# Patient Record
Sex: FEMALE | URBAN - METROPOLITAN AREA
[De-identification: names, ages, dates, MRNs, and addresses within clinical notes are randomized per-mention and may not be internally consistent; named-entity substitution may affect disease eponyms.]

---

## 2020-11-04 ENCOUNTER — HOSPITAL ENCOUNTER (OUTPATIENT)
Age: 30
Discharge: HOME | End: 2020-11-04
Payer: COMMERCIAL

## 2020-11-04 PROCEDURE — U0003 INFECTIOUS AGENT DETECTION BY NUCLEIC ACID (DNA OR RNA); SEVERE ACUTE RESPIRATORY SYNDROME CORONAVIRUS 2 (SARS-COV-2) (CORONAVIRUS DISEASE [COVID-19]), AMPLIFIED PROBE TECHNIQUE, MAKING USE OF HIGH THROUGHPUT TECHNOLOGIES AS DESCRIBED BY CMS-2020-01-R: HCPCS

## 2020-11-05 LAB
SARS-COV-2, RAPID: NORMAL
SARS-COV-2: NORMAL
SARS-COV-2: NOT DETECTED
SOURCE: NORMAL

## 2023-09-28 ENCOUNTER — TELEPHONE (OUTPATIENT)
Age: 33
End: 2023-09-28

## 2023-09-28 NOTE — TELEPHONE ENCOUNTER
Rodolfo Ascencio is calling to request a refill on the following medication(s):    Medication Request:  Requested Prescriptions     Pending Prescriptions Disp Refills    Mometasone Furoate 50 MCG/ACT AERO       Sig: Inhale 2 sprays into the lungs daily 2 sprays each nostril daily       Last Visit Date (If Applicable):  Visit date not found    Next Visit Date:    10/3/2023

## 2023-09-28 NOTE — TELEPHONE ENCOUNTER
Patient is requesting refill on Mometasone Nasal spray. [address]. Patient has an appt with you on 10/3 for sinus issues.

## 2023-09-30 VITALS
SYSTOLIC BLOOD PRESSURE: 108 MMHG | HEART RATE: 85 BPM | DIASTOLIC BLOOD PRESSURE: 68 MMHG | RESPIRATION RATE: 14 BRPM | WEIGHT: 117.4 LBS

## 2023-09-30 RX ORDER — BECLOMETHASONE DIPROPIONATE 80 UG/1
2 AEROSOL, METERED NASAL 2 TIMES DAILY
COMMUNITY

## 2023-09-30 RX ORDER — AZELASTINE HYDROCHLORIDE, FLUTICASONE PROPIONATE 137; 50 UG/1; UG/1
SPRAY, METERED NASAL
COMMUNITY

## 2023-09-30 RX ORDER — CYCLOBENZAPRINE HCL 5 MG
5 TABLET ORAL 3 TIMES DAILY PRN
COMMUNITY

## 2023-09-30 RX ORDER — CELECOXIB 200 MG/1
200 CAPSULE ORAL 2 TIMES DAILY
COMMUNITY

## 2023-09-30 RX ORDER — ATOGEPANT 60 MG/1
1 TABLET ORAL DAILY
COMMUNITY
Start: 2023-09-01

## 2023-10-03 ENCOUNTER — OFFICE VISIT (OUTPATIENT)
Age: 33
End: 2023-10-03

## 2023-10-03 VITALS
TEMPERATURE: 98.1 F | DIASTOLIC BLOOD PRESSURE: 82 MMHG | BODY MASS INDEX: 19.97 KG/M2 | HEART RATE: 94 BPM | SYSTOLIC BLOOD PRESSURE: 118 MMHG | OXYGEN SATURATION: 99 % | WEIGHT: 117 LBS | HEIGHT: 64 IN | RESPIRATION RATE: 14 BRPM

## 2023-10-03 DIAGNOSIS — G43.919 INTRACTABLE MIGRAINE WITHOUT STATUS MIGRAINOSUS, UNSPECIFIED MIGRAINE TYPE: ICD-10-CM

## 2023-10-03 DIAGNOSIS — J01.01 ACUTE RECURRENT MAXILLARY SINUSITIS: Primary | ICD-10-CM

## 2023-10-03 RX ORDER — AMOXICILLIN AND CLAVULANATE POTASSIUM 875; 125 MG/1; MG/1
1 TABLET, FILM COATED ORAL 2 TIMES DAILY
Qty: 28 TABLET | Refills: 0 | Status: SHIPPED | OUTPATIENT
Start: 2023-10-03 | End: 2023-10-17

## 2023-10-03 RX ORDER — ONDANSETRON 4 MG/1
4 TABLET, ORALLY DISINTEGRATING ORAL 3 TIMES DAILY PRN
Qty: 21 TABLET | Refills: 1 | Status: SHIPPED | OUTPATIENT
Start: 2023-10-03

## 2023-10-03 RX ORDER — MOMETASONE FUROATE 50 UG/1
SPRAY, METERED NASAL
COMMUNITY
Start: 2023-09-28

## 2023-10-03 SDOH — ECONOMIC STABILITY: HOUSING INSECURITY
IN THE LAST 12 MONTHS, WAS THERE A TIME WHEN YOU DID NOT HAVE A STEADY PLACE TO SLEEP OR SLEPT IN A SHELTER (INCLUDING NOW)?: NO

## 2023-10-03 SDOH — ECONOMIC STABILITY: INCOME INSECURITY: HOW HARD IS IT FOR YOU TO PAY FOR THE VERY BASICS LIKE FOOD, HOUSING, MEDICAL CARE, AND HEATING?: NOT HARD AT ALL

## 2023-10-03 SDOH — ECONOMIC STABILITY: FOOD INSECURITY: WITHIN THE PAST 12 MONTHS, THE FOOD YOU BOUGHT JUST DIDN'T LAST AND YOU DIDN'T HAVE MONEY TO GET MORE.: NEVER TRUE

## 2023-10-03 SDOH — ECONOMIC STABILITY: FOOD INSECURITY: WITHIN THE PAST 12 MONTHS, YOU WORRIED THAT YOUR FOOD WOULD RUN OUT BEFORE YOU GOT MONEY TO BUY MORE.: NEVER TRUE

## 2023-10-03 ASSESSMENT — PATIENT HEALTH QUESTIONNAIRE - PHQ9
SUM OF ALL RESPONSES TO PHQ QUESTIONS 1-9: 0
SUM OF ALL RESPONSES TO PHQ QUESTIONS 1-9: 0
2. FEELING DOWN, DEPRESSED OR HOPELESS: 0
SUM OF ALL RESPONSES TO PHQ QUESTIONS 1-9: 0
SUM OF ALL RESPONSES TO PHQ9 QUESTIONS 1 & 2: 0
SUM OF ALL RESPONSES TO PHQ QUESTIONS 1-9: 0
1. LITTLE INTEREST OR PLEASURE IN DOING THINGS: 0

## 2023-10-03 NOTE — PROGRESS NOTES
[facility]     Date of Visit:  10/6/2023  Patient Name: Sarai Fraire   Patient :  1990     CHIEF COMPLAINT/HPI:     Sarai Fraire is a 26 y.o. female who presents today for an general visit to be evaluated for the following condition(s):  Chief Complaint   Patient presents with    Sinus Problem     Green and yellow phleghm x 2 weeks. Cough     Patient problems with sinus congestion/drainage/chest.  Has chronic migraines and needs intermittent FMLA filled out. REVIEW OF SYSTEM      Review of Systems   Respiratory:  Negative for chest tightness and shortness of breath. Cardiovascular:  Negative for chest pain. REVIEWED INFORMATION      Allergies   Allergen Reactions    Gardasil 9 [Hpv 9-Valent Recomb Vaccine] Hives       Current Outpatient Medications   Medication Sig Dispense Refill    ondansetron (ZOFRAN-ODT) 4 MG disintegrating tablet Take 1 tablet by mouth 3 times daily as needed for Nausea or Vomiting 21 tablet 1    amoxicillin-clavulanate (AUGMENTIN) 875-125 MG per tablet Take 1 tablet by mouth 2 times daily for 14 days 28 tablet 0    QULIPTA 60 MG TABS Take 1 tablet by mouth daily      cyclobenzaprine (FLEXERIL) 5 MG tablet Take 1 tablet by mouth 3 times daily as needed for Muscle spasms      Mometasone Furoate 50 MCG/ACT AERO 2 sprays by Nasal route daily 2 sprays each nostril daily 1 each 5    mometasone (NASONEX) 50 MCG/ACT nasal spray       Azelastine-Fluticasone 137-50 MCG/ACT SUSP by Nasal route (Patient not taking: Reported on 10/3/2023)      beclomethasone (QNASL) 80 MCG/ACT AERS nasal spray 2 sprays by Each Nostril route 2 times daily (Patient not taking: Reported on 10/3/2023)      celecoxib (CELEBREX) 200 MG capsule Take 1 capsule by mouth 2 times daily (Patient not taking: Reported on 10/3/2023)       No current facility-administered medications for this visit. There is no problem list on file for this patient.       Past Medical History:   Diagnosis

## 2023-10-06 ASSESSMENT — ENCOUNTER SYMPTOMS
SHORTNESS OF BREATH: 0
CHEST TIGHTNESS: 0

## 2024-04-09 ENCOUNTER — TELEPHONE (OUTPATIENT)
Age: 34
End: 2024-04-09

## 2024-05-15 ENCOUNTER — OFFICE VISIT (OUTPATIENT)
Age: 34
End: 2024-05-15
Payer: COMMERCIAL

## 2024-05-15 VITALS
SYSTOLIC BLOOD PRESSURE: 120 MMHG | TEMPERATURE: 97.8 F | OXYGEN SATURATION: 98 % | HEART RATE: 80 BPM | WEIGHT: 116.9 LBS | BODY MASS INDEX: 20.07 KG/M2 | DIASTOLIC BLOOD PRESSURE: 70 MMHG | RESPIRATION RATE: 14 BRPM

## 2024-05-15 DIAGNOSIS — J01.01 ACUTE RECURRENT MAXILLARY SINUSITIS: Primary | ICD-10-CM

## 2024-05-15 DIAGNOSIS — M62.838 MUSCLE SPASM: ICD-10-CM

## 2024-05-15 PROCEDURE — 99213 OFFICE O/P EST LOW 20 MIN: CPT | Performed by: FAMILY MEDICINE

## 2024-05-15 RX ORDER — CEFDINIR 300 MG/1
300 CAPSULE ORAL 2 TIMES DAILY
Qty: 20 CAPSULE | Refills: 0 | Status: SHIPPED | OUTPATIENT
Start: 2024-05-15 | End: 2024-05-25

## 2024-05-15 RX ORDER — PREDNISONE 10 MG/1
TABLET ORAL
Qty: 10 TABLET | Refills: 0 | Status: SHIPPED | OUTPATIENT
Start: 2024-05-15

## 2024-05-15 RX ORDER — CYCLOBENZAPRINE HCL 5 MG
5 TABLET ORAL 3 TIMES DAILY PRN
Qty: 30 TABLET | Refills: 0 | Status: SHIPPED | OUTPATIENT
Start: 2024-05-15 | End: 2024-05-25

## 2024-05-15 ASSESSMENT — PATIENT HEALTH QUESTIONNAIRE - PHQ9
2. FEELING DOWN, DEPRESSED OR HOPELESS: NOT AT ALL
SUM OF ALL RESPONSES TO PHQ9 QUESTIONS 1 & 2: 0
SUM OF ALL RESPONSES TO PHQ QUESTIONS 1-9: 0
SUM OF ALL RESPONSES TO PHQ QUESTIONS 1-9: 0
1. LITTLE INTEREST OR PLEASURE IN DOING THINGS: NOT AT ALL
SUM OF ALL RESPONSES TO PHQ QUESTIONS 1-9: 0
SUM OF ALL RESPONSES TO PHQ QUESTIONS 1-9: 0

## 2024-05-15 NOTE — PROGRESS NOTES
Muscle spasm       Orders Placed This Encounter   Medications    cefdinir (OMNICEF) 300 MG capsule     Sig: Take 1 capsule by mouth 2 times daily for 10 days     Dispense:  20 capsule     Refill:  0    predniSONE (DELTASONE) 10 MG tablet     Sig: 3 tabs daily X 3 days then 2 tabs X 3 days then 1 tab X 3 days then off; take with food     Dispense:  10 tablet     Refill:  0    neomycin-polymyxin-hydrocortisone (CORTISPORIN) 3.5-18765-6 otic solution     Sig: Place 4 drops in ear(s) 3 times daily for 10 days Instill into BILAT Ear     Dispense:  10 mL     Refill:  0    cyclobenzaprine (FLEXERIL) 5 MG tablet     Sig: Take 1 tablet by mouth 3 times daily as needed for Muscle spasms     Dispense:  30 tablet     Refill:  0             No follow-ups on file.      COMMUNICATION:     Disposition and Communication:     Electronically signed by Lester Coronado MD on 5/15/2024 at 9:41 AM

## 2024-05-27 ASSESSMENT — ENCOUNTER SYMPTOMS
CHEST TIGHTNESS: 0
SHORTNESS OF BREATH: 0

## 2024-08-28 ENCOUNTER — OFFICE VISIT (OUTPATIENT)
Age: 34
End: 2024-08-28
Payer: COMMERCIAL

## 2024-08-28 VITALS
WEIGHT: 116.2 LBS | SYSTOLIC BLOOD PRESSURE: 120 MMHG | OXYGEN SATURATION: 99 % | HEART RATE: 98 BPM | BODY MASS INDEX: 19.84 KG/M2 | TEMPERATURE: 98 F | HEIGHT: 64 IN | DIASTOLIC BLOOD PRESSURE: 80 MMHG

## 2024-08-28 DIAGNOSIS — R53.83 OTHER FATIGUE: ICD-10-CM

## 2024-08-28 DIAGNOSIS — R61 NIGHT SWEATS: ICD-10-CM

## 2024-08-28 DIAGNOSIS — Z00.00 HEALTH MAINTENANCE EXAMINATION: Primary | ICD-10-CM

## 2024-08-28 PROCEDURE — 99395 PREV VISIT EST AGE 18-39: CPT | Performed by: FAMILY MEDICINE

## 2024-08-28 RX ORDER — GALCANEZUMAB 120 MG/ML
INJECTION, SOLUTION SUBCUTANEOUS
COMMUNITY
Start: 2024-07-05

## 2024-08-28 RX ORDER — UBROGEPANT 100 MG/1
TABLET ORAL
COMMUNITY
Start: 2024-07-02

## 2024-08-28 NOTE — PROGRESS NOTES
[facility]     Date of Visit:  9/3/2024  Patient Name: Kaitlyn Moses   Patient :  1990     CHIEF COMPLAINT/HPI:     Kaitlyn Moses is a 34 y.o. female who presents today for an general visit to be evaluated for the following condition(s):  Chief Complaint   Patient presents with    Annual Exam     She is here for her annual visit without labs.  She does have FMLA forms to be filled out.    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   is working many hours.  She is having lots of hormonal issues.  She noticing more migraines the week before her period and is getting drastic mood swings.  She is uncertain what is going on.  Estrogren thyroid or cortisol .  She is having random sweating episodes as well.  Joint pain.  Her periods have been more irregular.    REVIEW OF SYSTEM      Review of Systems   Respiratory:  Negative for chest tightness and shortness of breath.    Cardiovascular:  Negative for chest pain.         REVIEWED INFORMATION      Allergies   Allergen Reactions    Gardasil 9 [Hpv 9-Valent Recomb Vaccine] Hives       Current Outpatient Medications   Medication Sig Dispense Refill    EMGALITY 120 MG/ML SOAJ INJECT SUBCUTANEOUSLY AS DIRECTED 2 INJECTIONS THE FIRST MONTH      UBRELVY 100 MG TABS TAKE ONE TABLET BY MOUTH AS NEEDED FOR MIGRAINE EVERY 2 HOURS AS DIRECTED MAX 2 TABLETS DAILY AND 4 TABLETS WEEKLY      Multiple Vitamin (MULTI-VITAMIN DAILY PO) Take 1 tablet by mouth daily      ondansetron (ZOFRAN-ODT) 4 MG disintegrating tablet Take 1 tablet by mouth 3 times daily as needed for Nausea or Vomiting 21 tablet 1    cyclobenzaprine (FLEXERIL) 5 MG tablet Take 1 tablet by mouth 3 times daily as needed for Muscle spasms      Mometasone Furoate 50 MCG/ACT AERO 2 sprays by Nasal route daily 2 sprays each nostril daily 1 each 5     No current facility-administered medications for this visit.        There is no problem list on file for this patient.      Past Medical History:

## 2024-08-28 NOTE — PATIENT INSTRUCTIONS
Kaitlyn    Thank you for choosing [facility].  We know you have options when it comes to your healthcare; we appreciate that you chose us. Our goal is to provide exceptional  service and world class care to every patient.  You will be receiving a survey via email or text message asking for your feedback.  Please take a few minutes to share your thoughts about your recent visit. Your comments help us understand what we do well and ways we can improve.  Thank you in advance for your valuable feedback.      Dr. NARESH Holm

## 2024-09-03 ASSESSMENT — ENCOUNTER SYMPTOMS
CHEST TIGHTNESS: 0
SHORTNESS OF BREATH: 0

## 2024-09-06 ENCOUNTER — PATIENT MESSAGE (OUTPATIENT)
Age: 34
End: 2024-09-06

## 2024-09-06 ENCOUNTER — TELEPHONE (OUTPATIENT)
Age: 34
End: 2024-09-06

## 2024-09-06 DIAGNOSIS — E06.3 HASHIMOTO'S THYROIDITIS: Primary | ICD-10-CM

## 2024-09-06 RX ORDER — PREDNISONE 20 MG/1
TABLET ORAL
Qty: 18 TABLET | Refills: 0 | Status: SHIPPED | OUTPATIENT
Start: 2024-09-06

## 2024-09-06 NOTE — TELEPHONE ENCOUNTER
Eliza Coronado!     I saw my bloodwork results showed the thyroid peroxidase antibody were very high in comparison to the norm and what that may indicate. What is the next step to remedy this abnormality? I want to be proactive with my health. Do I need put on medication? Referred to an endocrinologist? Another appointment with you?      Also, I am still experiencing the joint pain I described at my appointment. I have periodically experienced this joint pain for the last 6 years. This current \"flare up\" has been going on for 4 weeks (since the school year started). It's seems to be getting worse, not better, with all day aches that are noticeable. When I was sick in the spring, also with joint pain, a steroid resolved the joint pain. I was wondering if it would be possible to get another prescription for a steroid.      Please let me know your thoughts, I'm concerned about my health.      Thanks! Have a great Friday!!     Tiffany Hi

## 2024-09-30 ENCOUNTER — OFFICE VISIT (OUTPATIENT)
Age: 34
End: 2024-09-30
Payer: COMMERCIAL

## 2024-09-30 VITALS
HEART RATE: 84 BPM | HEIGHT: 64 IN | BODY MASS INDEX: 19.33 KG/M2 | WEIGHT: 113.2 LBS | OXYGEN SATURATION: 96 % | DIASTOLIC BLOOD PRESSURE: 70 MMHG | SYSTOLIC BLOOD PRESSURE: 128 MMHG

## 2024-09-30 DIAGNOSIS — E06.3 HASHIMOTO'S THYROIDITIS: Primary | ICD-10-CM

## 2024-09-30 DIAGNOSIS — M25.50 ARTHRALGIA, UNSPECIFIED JOINT: ICD-10-CM

## 2024-09-30 PROCEDURE — 99214 OFFICE O/P EST MOD 30 MIN: CPT | Performed by: FAMILY MEDICINE

## 2024-09-30 RX ORDER — LEVOTHYROXINE SODIUM 75 UG/1
75 TABLET ORAL DAILY
Qty: 90 TABLET | Refills: 1 | Status: SHIPPED | OUTPATIENT
Start: 2024-09-30

## 2024-09-30 NOTE — PROGRESS NOTES
[facility]     Date of Visit:  2024  Patient Name: Kaitlyn Moses   Patient :  1990     CHIEF COMPLAINT/HPI:     Kaitlyn Moses is a 34 y.o. female who presents today for an general visit to be evaluated for the following condition(s):  Chief Complaint   Patient presents with    Fatigue     Patient would like to discuss her autoimmune disease, fatigue, and weakness.   Patient states she is feeling exhausted.  Taking naps daily.  Patient feeling physically weak.  Patient has cut back caffeine.  Labs show positive Anti- TPO antibodies.    REVIEW OF SYSTEM      Review of Systems   Respiratory:  Negative for chest tightness and shortness of breath.    Cardiovascular:  Negative for chest pain.         REVIEWED INFORMATION      Allergies   Allergen Reactions    Gardasil 9 [Human Papillomavirus 9-Valent Recombinant Vaccine] Hives       Current Outpatient Medications   Medication Sig Dispense Refill    levothyroxine (SYNTHROID) 75 MCG tablet Take 1 tablet by mouth daily 90 tablet 1    busPIRone (BUSPAR) 5 MG tablet Take 1 tablet by mouth 2 times daily 60 tablet 2    ALPRAZolam (XANAX) 0.25 MG tablet Take 1 tablet by mouth 2 times daily as needed for Anxiety for up to 40 days. Max Daily Amount: 0.5 mg 40 tablet 1    EMGALITY 120 MG/ML SOAJ INJECT SUBCUTANEOUSLY AS DIRECTED 2 INJECTIONS THE FIRST MONTH      UBRELVY 100 MG TABS TAKE ONE TABLET BY MOUTH AS NEEDED FOR MIGRAINE EVERY 2 HOURS AS DIRECTED MAX 2 TABLETS DAILY AND 4 TABLETS WEEKLY      Multiple Vitamin (MULTI-VITAMIN DAILY PO) Take 1 tablet by mouth daily      ondansetron (ZOFRAN-ODT) 4 MG disintegrating tablet Take 1 tablet by mouth 3 times daily as needed for Nausea or Vomiting 21 tablet 1    cyclobenzaprine (FLEXERIL) 5 MG tablet Take 1 tablet by mouth 3 times daily as needed for Muscle spasms      Mometasone Furoate 50 MCG/ACT AERO 2 sprays by Nasal route daily 2 sprays each nostril daily 1 each 5    predniSONE (DELTASONE) 20 MG

## 2024-10-01 ASSESSMENT — ENCOUNTER SYMPTOMS
SHORTNESS OF BREATH: 0
CHEST TIGHTNESS: 0

## 2024-11-16 DIAGNOSIS — J01.01 ACUTE RECURRENT MAXILLARY SINUSITIS: Primary | ICD-10-CM

## 2024-11-18 NOTE — TELEPHONE ENCOUNTER
Kaitlyn Moses is calling to request a refill on the following medication(s):    Medication Request:  Requested Prescriptions     Pending Prescriptions Disp Refills    mometasone (NASONEX) 50 MCG/ACT nasal spray [Pharmacy Med Name: Mometasone Furoate 50 MCG/ACT Nasal Suspension] 17 g 0     Sig: Use 2 spray(s) in each nostril once daily       Last Visit Date (If Applicable):  9/30/2024    Next Visit Date:    12/3/2024

## 2024-11-19 RX ORDER — MOMETASONE FUROATE MONOHYDRATE 50 UG/1
SPRAY, METERED NASAL
Qty: 17 G | Refills: 5 | Status: SHIPPED | OUTPATIENT
Start: 2024-11-19

## 2024-11-27 DIAGNOSIS — M25.50 ARTHRALGIA, UNSPECIFIED JOINT: ICD-10-CM

## 2024-11-27 DIAGNOSIS — E06.3 HASHIMOTO'S THYROIDITIS: ICD-10-CM

## 2024-11-27 LAB
ALBUMIN: 4.6 G/DL
ALP BLD-CCNC: 57 U/L
ALT SERPL-CCNC: 39 U/L
ANION GAP SERPL CALCULATED.3IONS-SCNC: NORMAL MMOL/L
AST SERPL-CCNC: 34 U/L
BASOPHILS ABSOLUTE: 0.02 /ΜL
BASOPHILS RELATIVE PERCENT: 0.4 %
BILIRUB SERPL-MCNC: 0.8 MG/DL (ref 0.1–1.4)
BUN BLDV-MCNC: 19 MG/DL
CALCIUM SERPL-MCNC: 9.4 MG/DL
CHLORIDE BLD-SCNC: 109 MMOL/L
CO2: 24 MMOL/L
CREAT SERPL-MCNC: 0.65 MG/DL
EOSINOPHILS ABSOLUTE: 0.15 /ΜL
EOSINOPHILS RELATIVE PERCENT: 3.3 %
GFR, ESTIMATED: 103.1
GLUCOSE BLD-MCNC: 85 MG/DL
HCT VFR BLD CALC: 42.1 % (ref 36–46)
HEMOGLOBIN: 14.1 G/DL (ref 12–16)
LYMPHOCYTES ABSOLUTE: 1.93 /ΜL
LYMPHOCYTES RELATIVE PERCENT: 42.4 %
MCH RBC QN AUTO: 31.1 PG
MCHC RBC AUTO-ENTMCNC: 33.5 G/DL
MCV RBC AUTO: 92.7 FL
MONOCYTES ABSOLUTE: 0.42 /ΜL
MONOCYTES RELATIVE PERCENT: 9.2 %
NEUTROPHILS ABSOLUTE: 2.03 /ΜL
NEUTROPHILS RELATIVE PERCENT: 44.7 %
PDW BLD-RTO: 42.5 %
PLATELET # BLD: 196 K/ΜL
PMV BLD AUTO: NORMAL FL
POTASSIUM SERPL-SCNC: 4.1 MMOL/L
RBC # BLD: 4.54 10^6/ΜL
SED RATE, AUTOMATED: 5
SODIUM BLD-SCNC: 140 MMOL/L
T3 FREE: 2.99
T4 FREE: 1.11
TOTAL PROTEIN: 7.2 G/DL (ref 6.4–8.2)
TSH SERPL DL<=0.05 MIU/L-ACNC: 1.7 UIU/ML
WBC # BLD: 4.55 10^3/ML

## 2024-12-02 SDOH — ECONOMIC STABILITY: FOOD INSECURITY: WITHIN THE PAST 12 MONTHS, YOU WORRIED THAT YOUR FOOD WOULD RUN OUT BEFORE YOU GOT MONEY TO BUY MORE.: NEVER TRUE

## 2024-12-02 SDOH — ECONOMIC STABILITY: FOOD INSECURITY: WITHIN THE PAST 12 MONTHS, THE FOOD YOU BOUGHT JUST DIDN'T LAST AND YOU DIDN'T HAVE MONEY TO GET MORE.: NEVER TRUE

## 2024-12-02 SDOH — ECONOMIC STABILITY: TRANSPORTATION INSECURITY
IN THE PAST 12 MONTHS, HAS LACK OF TRANSPORTATION KEPT YOU FROM MEETINGS, WORK, OR FROM GETTING THINGS NEEDED FOR DAILY LIVING?: NO

## 2024-12-02 SDOH — ECONOMIC STABILITY: INCOME INSECURITY: HOW HARD IS IT FOR YOU TO PAY FOR THE VERY BASICS LIKE FOOD, HOUSING, MEDICAL CARE, AND HEATING?: NOT HARD AT ALL

## 2024-12-03 ENCOUNTER — OFFICE VISIT (OUTPATIENT)
Age: 34
End: 2024-12-03
Payer: COMMERCIAL

## 2024-12-03 VITALS
BODY MASS INDEX: 18.68 KG/M2 | WEIGHT: 109.4 LBS | TEMPERATURE: 98 F | SYSTOLIC BLOOD PRESSURE: 110 MMHG | DIASTOLIC BLOOD PRESSURE: 80 MMHG | OXYGEN SATURATION: 99 % | HEIGHT: 64 IN | HEART RATE: 89 BPM

## 2024-12-03 DIAGNOSIS — R53.83 OTHER FATIGUE: ICD-10-CM

## 2024-12-03 DIAGNOSIS — I47.11 INAPPROPRIATE SINUS TACHYCARDIA (HCC): Primary | ICD-10-CM

## 2024-12-03 DIAGNOSIS — R74.01 ELEVATED ALT MEASUREMENT: ICD-10-CM

## 2024-12-03 DIAGNOSIS — E03.9 HYPOTHYROIDISM, UNSPECIFIED TYPE: ICD-10-CM

## 2024-12-03 PROCEDURE — 99214 OFFICE O/P EST MOD 30 MIN: CPT | Performed by: FAMILY MEDICINE

## 2024-12-03 RX ORDER — LEVOTHYROXINE SODIUM 50 UG/1
50 TABLET ORAL DAILY
Qty: 90 TABLET | Refills: 1 | Status: SHIPPED | OUTPATIENT
Start: 2024-12-03

## 2024-12-03 RX ORDER — TRAZODONE HYDROCHLORIDE 50 MG/1
TABLET, FILM COATED ORAL
Qty: 60 TABLET | Refills: 1 | Status: SHIPPED | OUTPATIENT
Start: 2024-12-03

## 2024-12-03 NOTE — PROGRESS NOTES
[facility]  [facility]  [address]  [facility] [address]  Dept: [phone]     Date of Visit:  2024  Patient Name: Kaitlyn Moses   Patient :  1990     CHIEF COMPLAINT/HPI:     Kaitlyn Moses is a 34 y.o. female who presents today for an general visit to be evaluated for the following condition(s):  Chief Complaint   Patient presents with    Blood Work     She is here for a follow up on her labs from 2024.      Patient states last week she felt better than she had in months.  Energy level is still poor.  Patient started 75 mcg Oct 1st.  She had problems with sweats, insomnia.  Is only taking 37.5 mcg daily.  Patient did change diet to autoimmune protocal diet- decreased caffeine, ETOH.  Patient alternates with insomnia, night-time awakenings.  Patient exhausted with minimal activity.  Gets lightheaded after working out.  REVIEW OF SYSTEM      Review of Systems   Respiratory:  Negative for chest tightness and shortness of breath.    Cardiovascular:  Negative for chest pain.         REVIEWED INFORMATION      Allergies   Allergen Reactions    Gardasil 9 [Human Papillomavirus 9-Valent Recombinant Vaccine] Hives       Current Outpatient Medications   Medication Sig Dispense Refill    traZODone (DESYREL) 50 MG tablet 1 OR 2 TABS NIGHTLY 60 tablet 1    levothyroxine (SYNTHROID) 50 MCG tablet Take 1 tablet by mouth daily 90 tablet 1    mometasone (NASONEX) 50 MCG/ACT nasal spray Use 2 spray(s) in each nostril once daily 17 g 5    levothyroxine (SYNTHROID) 75 MCG tablet Take 1 tablet by mouth daily 90 tablet 1    busPIRone (BUSPAR) 5 MG tablet Take 1 tablet by mouth 2 times daily 60 tablet 2    EMGALITY 120 MG/ML SOAJ INJECT SUBCUTANEOUSLY AS DIRECTED 2 INJECTIONS THE FIRST MONTH      UBRELVY 100 MG TABS TAKE ONE TABLET BY MOUTH AS NEEDED FOR MIGRAINE EVERY 2 HOURS AS DIRECTED MAX 2 TABLETS DAILY AND 4 TABLETS WEEKLY      Multiple Vitamin

## 2024-12-03 NOTE — PATIENT INSTRUCTIONS
Kaitlyn    Thank you for choosing [facility].  We know you have options when it comes to your healthcare; we appreciate that you chose us. Our goal is to provide exceptional  service and world class care to every patient.  You will be receiving a survey via email or text message asking for your feedback.  Please take a few minutes to share your thoughts about your recent visit. Your comments help us understand what we do well and ways we can improve.  Thank you in advance for your valuable feedback.      Dr. NARESH Holm

## 2024-12-09 ASSESSMENT — ENCOUNTER SYMPTOMS
CHEST TIGHTNESS: 0
SHORTNESS OF BREATH: 0

## 2025-01-06 ENCOUNTER — PATIENT MESSAGE (OUTPATIENT)
Age: 35
End: 2025-01-06

## 2025-01-06 DIAGNOSIS — R53.83 OTHER FATIGUE: ICD-10-CM

## 2025-01-06 DIAGNOSIS — I47.11 INAPPROPRIATE SINUS TACHYCARDIA (HCC): Primary | ICD-10-CM

## 2025-01-06 DIAGNOSIS — R61 NIGHT SWEATS: ICD-10-CM

## 2025-01-06 DIAGNOSIS — E03.9 HYPOTHYROIDISM, UNSPECIFIED TYPE: ICD-10-CM

## 2025-01-06 DIAGNOSIS — R74.01 ELEVATED ALT MEASUREMENT: ICD-10-CM

## 2025-01-10 LAB
ALBUMIN: 4.9 G/DL
ALP BLD-CCNC: 75 U/L
ALT SERPL-CCNC: 49 U/L
ANION GAP SERPL CALCULATED.3IONS-SCNC: NORMAL MMOL/L
AST SERPL-CCNC: 38 U/L
BASOPHILS ABSOLUTE: 0.02 /ΜL
BASOPHILS RELATIVE PERCENT: 0.4 %
BILIRUB SERPL-MCNC: 0.4 MG/DL (ref 0.1–1.4)
BUN BLDV-MCNC: 13 MG/DL
C-REACTIVE PROTEIN: 2.29
CALCIUM SERPL-MCNC: 9.9 MG/DL
CHLORIDE BLD-SCNC: 103 MMOL/L
CO2: 27 MMOL/L
CORTISOL: 10.9
CREAT SERPL-MCNC: 0.62 MG/DL
EOSINOPHILS ABSOLUTE: 0.14 /ΜL
EOSINOPHILS RELATIVE PERCENT: 2.9 %
FERRITIN: 26.9 NG/ML (ref 9–150)
GFR, ESTIMATED: 118.3
GLUCOSE BLD-MCNC: 88 MG/DL
HCT VFR BLD CALC: 41.2 % (ref 36–46)
HEMOGLOBIN: 14.1 G/DL (ref 12–16)
IRON: 75
LYMPHOCYTES ABSOLUTE: 1.98 /ΜL
LYMPHOCYTES RELATIVE PERCENT: 41.2 %
MAGNESIUM: 2.1 MG/DL
MCH RBC QN AUTO: 31.6 PG
MCHC RBC AUTO-ENTMCNC: 34.2 G/DL
MCV RBC AUTO: 92.4 FL
MONOCYTES ABSOLUTE: 0.36 /ΜL
MONOCYTES RELATIVE PERCENT: 7.5 %
NEUTROPHILS ABSOLUTE: 2.31 /ΜL
NEUTROPHILS RELATIVE PERCENT: 48 %
PDW BLD-RTO: 40.8 %
PLATELET # BLD: 218 K/ΜL
PMV BLD AUTO: NORMAL FL
POTASSIUM SERPL-SCNC: 3.7 MMOL/L
RBC # BLD: 4.46 10^6/ΜL
SODIUM BLD-SCNC: 138 MMOL/L
T3 FREE: 3.23
T4 FREE: 1.19
TOTAL IRON BINDING CAPACITY: 313
TOTAL PROTEIN: 7.8 G/DL (ref 6.4–8.2)
TSH SERPL DL<=0.05 MIU/L-ACNC: 2.13 UIU/ML
VITAMIN B-12: >1000
VITAMIN D 25-HYDROXY: 105.5
VITAMIN D2, 25 HYDROXY: NORMAL
VITAMIN D3,25 HYDROXY: NORMAL
WBC # BLD: 4.81 10^3/ML

## 2025-01-14 ENCOUNTER — PATIENT MESSAGE (OUTPATIENT)
Age: 35
End: 2025-01-14

## 2025-01-14 ENCOUNTER — HOSPITAL ENCOUNTER (OUTPATIENT)
Age: 35
Setting detail: SPECIMEN
Discharge: HOME | End: 2025-01-14

## 2025-01-14 ENCOUNTER — OFFICE VISIT (OUTPATIENT)
Age: 35
End: 2025-01-14
Payer: COMMERCIAL

## 2025-01-14 VITALS
BODY MASS INDEX: 16.92 KG/M2 | TEMPERATURE: 97.9 F | HEART RATE: 99 BPM | WEIGHT: 107.8 LBS | SYSTOLIC BLOOD PRESSURE: 108 MMHG | OXYGEN SATURATION: 96 % | DIASTOLIC BLOOD PRESSURE: 78 MMHG | HEIGHT: 67 IN

## 2025-01-14 DIAGNOSIS — E01.0 THYROMEGALY: Primary | ICD-10-CM

## 2025-01-14 DIAGNOSIS — R63.4 WEIGHT LOSS: ICD-10-CM

## 2025-01-14 DIAGNOSIS — R53.83 OTHER FATIGUE: ICD-10-CM

## 2025-01-14 DIAGNOSIS — B99.9 RECURRENT INFECTIONS: ICD-10-CM

## 2025-01-14 DIAGNOSIS — K63.8219 SMALL INTESTINAL BACTERIAL OVERGROWTH (SIBO): ICD-10-CM

## 2025-01-14 DIAGNOSIS — F41.9 ANXIETY: Primary | ICD-10-CM

## 2025-01-14 DIAGNOSIS — K58.0 IRRITABLE BOWEL SYNDROME WITH DIARRHEA: ICD-10-CM

## 2025-01-14 PROCEDURE — 99214 OFFICE O/P EST MOD 30 MIN: CPT | Performed by: FAMILY MEDICINE

## 2025-01-14 RX ORDER — BUSPIRONE HYDROCHLORIDE 5 MG/1
5 TABLET ORAL 2 TIMES DAILY
COMMUNITY

## 2025-01-14 RX ORDER — ALPRAZOLAM 0.25 MG/1
0.25 TABLET ORAL 2 TIMES DAILY PRN
Qty: 60 TABLET | Refills: 1 | Status: SHIPPED | OUTPATIENT
Start: 2025-01-14 | End: 2025-03-15

## 2025-01-14 RX ORDER — ALPRAZOLAM 0.25 MG/1
0.25 TABLET ORAL 2 TIMES DAILY PRN
COMMUNITY
Start: 2025-01-05 | End: 2025-01-14 | Stop reason: SDUPTHER

## 2025-01-14 RX ORDER — FLUCONAZOLE 150 MG/1
150 TABLET ORAL
Qty: 4 TABLET | Refills: 0 | Status: SHIPPED | OUTPATIENT
Start: 2025-01-14 | End: 2025-01-20

## 2025-01-14 RX ORDER — ESCITALOPRAM OXALATE 5 MG/1
5 TABLET ORAL DAILY
Qty: 30 TABLET | Refills: 5 | Status: SHIPPED | OUTPATIENT
Start: 2025-01-14

## 2025-01-14 SDOH — ECONOMIC STABILITY: FOOD INSECURITY: WITHIN THE PAST 12 MONTHS, YOU WORRIED THAT YOUR FOOD WOULD RUN OUT BEFORE YOU GOT MONEY TO BUY MORE.: NEVER TRUE

## 2025-01-14 SDOH — ECONOMIC STABILITY: FOOD INSECURITY: WITHIN THE PAST 12 MONTHS, THE FOOD YOU BOUGHT JUST DIDN'T LAST AND YOU DIDN'T HAVE MONEY TO GET MORE.: NEVER TRUE

## 2025-01-14 SDOH — ECONOMIC STABILITY: INCOME INSECURITY: IN THE LAST 12 MONTHS, WAS THERE A TIME WHEN YOU WERE NOT ABLE TO PAY THE MORTGAGE OR RENT ON TIME?: NO

## 2025-01-14 SDOH — ECONOMIC STABILITY: TRANSPORTATION INSECURITY
IN THE PAST 12 MONTHS, HAS THE LACK OF TRANSPORTATION KEPT YOU FROM MEDICAL APPOINTMENTS OR FROM GETTING MEDICATIONS?: NO

## 2025-01-14 ASSESSMENT — PATIENT HEALTH QUESTIONNAIRE - PHQ9
1. LITTLE INTEREST OR PLEASURE IN DOING THINGS: NOT AT ALL
SUM OF ALL RESPONSES TO PHQ QUESTIONS 1-9: 1
SUM OF ALL RESPONSES TO PHQ QUESTIONS 1-9: 1
SUM OF ALL RESPONSES TO PHQ9 QUESTIONS 1 & 2: 1
2. FEELING DOWN, DEPRESSED OR HOPELESS: SEVERAL DAYS
SUM OF ALL RESPONSES TO PHQ QUESTIONS 1-9: 1
SUM OF ALL RESPONSES TO PHQ QUESTIONS 1-9: 1

## 2025-01-14 NOTE — PROGRESS NOTES
rifAXIMin (XIFAXAN) 550 MG tablet; Take 1 tablet by mouth 3 times daily, Disp-42 tablet, R-0Print  3. Other fatigue  -     Immunoglobulins Panel; Future  -     Cytomegalovirus Ab,IGG,IGM; Future  -     Driss-Barr Virus VCA Antibody Panel; Future  -     CBC with Auto Differential; Future  4. Weight loss  -     Immunoglobulins Panel; Future  -     Cytomegalovirus Ab,IGG,IGM; Future  -     Driss-Barr Virus VCA Antibody Panel; Future  -     CBC with Auto Differential; Future  5. Irritable bowel syndrome with diarrhea  -     rifAXIMin (XIFAXAN) 550 MG tablet; Take 1 tablet by mouth 3 times daily, Disp-42 tablet, R-0Print  6. Recurrent infections  -     Immunoglobulins Panel; Future  -     Cytomegalovirus Ab,IGG,IGM; Future  -     Driss-Barr Virus VCA Antibody Panel; Future  -     CBC with Auto Differential; Future    No follow-ups on file.       Subjective   History of Present Illness  The patient is a 34-year-old female who presents for evaluation of anxiety, recurrent infections, weight loss, and heartburn.    She reports persistent fatigue, diminished physical strength, and fluctuating anxiety levels. Her sleep quality has improved, with an average of 7 to 8 hours per night, although stress-induced awakenings occur occasionally. She has been drinking a lot of water and gets up sometimes to urinate in the middle of the night but usually goes right back to sleep. She is currently on buspirone and has not taken trazodone as advised by Keyshawn. She has not tried venlafaxine. She experiences daily anxiety and has not previously taken any other medications for this condition. She is requesting a refill of her alprazolam prescription.    She also reports frequent illnesses, each lasting approximately 2 weeks, with only a few days of respite between episodes. This pattern began in 11/2024, following a period of good health until 10/2024. Despite maintaining a regular exercise regimen of 5 to 6 days per week, she has

## 2025-01-15 ENCOUNTER — HOSPITAL ENCOUNTER (OUTPATIENT)
Dept: ULTRASOUND IMAGING | Age: 35
Discharge: HOME | End: 2025-01-17
Payer: COMMERCIAL

## 2025-01-15 DIAGNOSIS — I47.11 INAPPROPRIATE SINUS TACHYCARDIA (HCC): ICD-10-CM

## 2025-01-15 DIAGNOSIS — R63.4 WEIGHT LOSS: ICD-10-CM

## 2025-01-15 DIAGNOSIS — R61 NIGHT SWEATS: ICD-10-CM

## 2025-01-15 DIAGNOSIS — E03.9 HYPOTHYROIDISM, UNSPECIFIED TYPE: ICD-10-CM

## 2025-01-15 DIAGNOSIS — R74.01 ELEVATED ALT MEASUREMENT: ICD-10-CM

## 2025-01-15 DIAGNOSIS — R53.83 OTHER FATIGUE: ICD-10-CM

## 2025-01-15 DIAGNOSIS — F41.9 ANXIETY: ICD-10-CM

## 2025-01-15 DIAGNOSIS — E01.0 THYROMEGALY: ICD-10-CM

## 2025-01-15 DIAGNOSIS — B99.9 RECURRENT INFECTIONS: ICD-10-CM

## 2025-01-15 PROCEDURE — 76536 US EXAM OF HEAD AND NECK: CPT

## 2025-01-18 LAB
COLLECT DURATION TIME SPEC: NORMAL H
CREAT 24H UR-MCNC: 34 MG/DL
CREATININE URINE /24 HR: NORMAL MG/D (ref 700–1600)
METHYLMALONIC ACID /MOL OF CREAT: 1.2 MMOL/MOL CRT (ref 0–3.6)
METHYLMALONIC ACID URINE: 3.6 UMOL/L
METHYLMALONIC INTERPRETATION: NORMAL
SPECIMEN VOL ?TM UR: 70 ML

## 2025-01-30 ENCOUNTER — TELEPHONE (OUTPATIENT)
Age: 35
End: 2025-01-30

## 2025-01-30 ENCOUNTER — PATIENT MESSAGE (OUTPATIENT)
Age: 35
End: 2025-01-30

## 2025-01-30 DIAGNOSIS — E03.9 HYPOTHYROIDISM, UNSPECIFIED TYPE: Primary | ICD-10-CM

## 2025-01-30 NOTE — TELEPHONE ENCOUNTER
Patient called to ask if we had rec'd a fax from [facility].  She said they are faxing a request for notes.  She is unable to schedule an appointment until they receive them.  She said they were refaxing request today.  She is going to call back w/fax number so we can fax labs and OV in case we don't get their fax.

## 2025-01-30 NOTE — TELEPHONE ENCOUNTER
Fax number to send information back to [facility] is [phone].  If we do not receive the fax from them, they need visit notes and findings sent to them before they will schedule an appointment to see her.    Also needs form from [email or web address] for Outpatient Consult Request.  I printed that form and put it in the mailbox in Dr. Coronado's office for signature.

## 2025-01-31 RX ORDER — LEVOTHYROXINE SODIUM 37.5 UG/1
1 CAPSULE ORAL DAILY
Qty: 30 CAPSULE | Refills: 5 | Status: SHIPPED | OUTPATIENT
Start: 2025-01-31

## 2025-02-05 RX ORDER — PREDNISONE 20 MG/1
TABLET ORAL
Qty: 18 TABLET | Refills: 0 | Status: SHIPPED | OUTPATIENT
Start: 2025-02-05

## 2025-02-05 NOTE — TELEPHONE ENCOUNTER
Kaitlyn Moses is calling to request a refill on the following medication(s):    Medication Request:  Requested Prescriptions     Pending Prescriptions Disp Refills    predniSONE (DELTASONE) 20 MG tablet [Pharmacy Med Name: predniSONE 20 MG Oral Tablet] 18 tablet 0     Sig: TAKE 3 TABLETS BY MOUTH ONCE DAILY FOR 3 DAYS THEN 2 ONCE DAILY FOR 3 DAYS THEN 1 ONCE DAILY FOR 3 DAYS THEN OFF  AND  TAKE  WITH  FOOD       Last Visit Date (If Applicable):  1/14/2025    Next Visit Date:    Visit date not found

## 2025-02-19 ENCOUNTER — PATIENT MESSAGE (OUTPATIENT)
Age: 35
End: 2025-02-19

## 2025-02-19 DIAGNOSIS — E03.9 HYPOTHYROIDISM, UNSPECIFIED TYPE: Primary | ICD-10-CM

## 2025-02-19 DIAGNOSIS — M79.10 MYALGIA: ICD-10-CM

## 2025-02-19 DIAGNOSIS — R63.4 WEIGHT LOSS: ICD-10-CM

## 2025-02-19 RX ORDER — LEVOTHYROXINE SODIUM 25 UG/1
1 CAPSULE ORAL DAILY
Qty: 30 CAPSULE | Refills: 5 | Status: SHIPPED | OUTPATIENT
Start: 2025-02-19

## 2025-03-24 RX ORDER — PREDNISONE 20 MG/1
TABLET ORAL
Qty: 18 TABLET | Refills: 0 | Status: SHIPPED | OUTPATIENT
Start: 2025-03-24

## 2025-03-24 NOTE — TELEPHONE ENCOUNTER
Kaitlyn Moses is calling to request a refill on the following medication(s):    Medication Request:  Requested Prescriptions     Pending Prescriptions Disp Refills    predniSONE (DELTASONE) 20 MG tablet 18 tablet 0     Sig: TAKE 3 TABLETS BY MOUTH ONCE DAILY FOR 3 DAYS THEN 2 ONCE DAILY FOR 3 DAYS THEN 1 ONCE DAILY FOR 3 DAYS THEN OFF  AND  TAKE  WITH  FOOD       Last Visit Date (If Applicable):  1/14/2025    Next Visit Date:    4/15/2025

## 2025-04-01 ENCOUNTER — PATIENT MESSAGE (OUTPATIENT)
Age: 35
End: 2025-04-01

## 2025-04-01 DIAGNOSIS — M79.10 MYALGIA: Primary | ICD-10-CM

## 2025-04-01 DIAGNOSIS — E03.9 HYPOTHYROIDISM, UNSPECIFIED TYPE: ICD-10-CM

## 2025-04-01 DIAGNOSIS — R53.83 OTHER FATIGUE: ICD-10-CM

## 2025-04-01 DIAGNOSIS — R74.01 ELEVATED ALT MEASUREMENT: ICD-10-CM

## 2025-04-02 LAB
ALBUMIN: 4.4 G/DL
ALP BLD-CCNC: 62 U/L
ALT SERPL-CCNC: 33 U/L
ANION GAP SERPL CALCULATED.3IONS-SCNC: ABNORMAL MMOL/L
AST SERPL-CCNC: 29 U/L
BASOPHILS ABSOLUTE: 0.02 /ΜL
BASOPHILS RELATIVE PERCENT: 0.4 %
BILIRUB SERPL-MCNC: 0.2 MG/DL (ref 0.1–1.4)
BUN BLDV-MCNC: 12 MG/DL
C-REACTIVE PROTEIN: <0.3
CALCIUM SERPL-MCNC: 9.5 MG/DL
CHLORIDE BLD-SCNC: 110 MMOL/L
CO2: 26 MMOL/L
CREAT SERPL-MCNC: 0.61 MG/DL
EOSINOPHILS ABSOLUTE: 0.17 /ΜL
EOSINOPHILS RELATIVE PERCENT: 3.4 %
GFR, ESTIMATED: 118.8
GLUCOSE BLD-MCNC: 90 MG/DL
HCT VFR BLD CALC: 42.1 % (ref 36–46)
HEMOGLOBIN: 14 G/DL (ref 12–16)
LYMPHOCYTES ABSOLUTE: 1.63 /ΜL
LYMPHOCYTES RELATIVE PERCENT: 32.9 %
MCH RBC QN AUTO: 31.5 PG
MCHC RBC AUTO-ENTMCNC: 33.3 G/DL
MCV RBC AUTO: 94.6 FL
MONOCYTES ABSOLUTE: 0.42 /ΜL
MONOCYTES RELATIVE PERCENT: 8.5 %
NEUTROPHILS ABSOLUTE: 2.7 /ΜL
NEUTROPHILS RELATIVE PERCENT: 54.6 %
PDW BLD-RTO: 41.9 %
PLATELET # BLD: 210 K/ΜL
PMV BLD AUTO: NORMAL FL
POTASSIUM SERPL-SCNC: 3.9 MMOL/L
PREALBUMIN: 26.9 MG/DL
RBC # BLD: 4.45 10^6/ΜL
SED RATE, AUTOMATED: 4
SODIUM BLD-SCNC: 139 MMOL/L
T3 FREE: 3.27
T4 FREE: 1.12
TOTAL CK: 46 U/L
TOTAL PROTEIN: 7 G/DL (ref 6.4–8.2)
TSH SERPL DL<=0.05 MIU/L-ACNC: 2.25 UIU/ML
WBC # BLD: 4.95 10^3/ML

## 2025-04-03 DIAGNOSIS — R53.83 OTHER FATIGUE: ICD-10-CM

## 2025-04-03 DIAGNOSIS — M79.10 MYALGIA: ICD-10-CM

## 2025-04-03 DIAGNOSIS — F41.9 ANXIETY: ICD-10-CM

## 2025-04-03 DIAGNOSIS — B99.9 RECURRENT INFECTIONS: ICD-10-CM

## 2025-04-03 DIAGNOSIS — E03.9 HYPOTHYROIDISM, UNSPECIFIED TYPE: ICD-10-CM

## 2025-04-03 DIAGNOSIS — R63.4 WEIGHT LOSS: ICD-10-CM

## 2025-04-04 DIAGNOSIS — R53.83 OTHER FATIGUE: ICD-10-CM

## 2025-04-04 DIAGNOSIS — F41.9 ANXIETY: ICD-10-CM

## 2025-04-04 DIAGNOSIS — B99.9 RECURRENT INFECTIONS: ICD-10-CM

## 2025-04-04 DIAGNOSIS — R63.4 WEIGHT LOSS: ICD-10-CM

## 2025-04-15 ENCOUNTER — OFFICE VISIT (OUTPATIENT)
Age: 35
End: 2025-04-15
Payer: COMMERCIAL

## 2025-04-15 ENCOUNTER — PATIENT MESSAGE (OUTPATIENT)
Age: 35
End: 2025-04-15

## 2025-04-15 VITALS
WEIGHT: 110.4 LBS | BODY MASS INDEX: 17.5 KG/M2 | TEMPERATURE: 98.6 F | HEART RATE: 83 BPM | OXYGEN SATURATION: 98 % | SYSTOLIC BLOOD PRESSURE: 108 MMHG | DIASTOLIC BLOOD PRESSURE: 60 MMHG

## 2025-04-15 DIAGNOSIS — T78.40XS ALLERGIC REACTION, SEQUELA: ICD-10-CM

## 2025-04-15 DIAGNOSIS — U07.1 COVID-19: ICD-10-CM

## 2025-04-15 DIAGNOSIS — M25.50 ARTHRALGIA, UNSPECIFIED JOINT: Primary | ICD-10-CM

## 2025-04-15 PROCEDURE — 99214 OFFICE O/P EST MOD 30 MIN: CPT | Performed by: FAMILY MEDICINE

## 2025-04-15 RX ORDER — FLUCONAZOLE 150 MG/1
150 TABLET ORAL
Qty: 4 TABLET | Refills: 1 | Status: SHIPPED | OUTPATIENT
Start: 2025-04-15 | End: 2025-05-01

## 2025-04-15 RX ORDER — MONTELUKAST SODIUM 10 MG/1
10 TABLET ORAL DAILY
Qty: 30 TABLET | Refills: 3 | Status: SHIPPED | OUTPATIENT
Start: 2025-04-15

## 2025-04-15 ASSESSMENT — ENCOUNTER SYMPTOMS
CHEST TIGHTNESS: 0
SHORTNESS OF BREATH: 0

## 2025-04-15 NOTE — PROGRESS NOTES
[facility]  [facility]  [address] [place] [address] [address]  [facility] [address]  Dept: [phone]     Date of Visit:  4/15/2025  Patient Name: Kaitlyn Moses   Patient :  1990     CHIEF COMPLAINT/HPI:     Kaitlyn Moses is a 35 y.o. female who presents today for an general visit to be evaluated for the following condition(s):  Chief Complaint   Patient presents with    Results     Labs done on 4/3 and .    Patient did get CGM- her sugars bottom out at the end of her workout.  She is also going under 70 at night as well.  Her light-headness has improved with this.  She is still getting sick monthly for 2 weeks.  Her hands hurt every morning when she wakes up.  She notices all of her joints are bothering her.  She is suspicious that COVID is .  Patient had anaphylactic reaction to a brazil nut.  Consider allergen referral consider rheum referral.  Patient is going to stop the levothyroxine this summer.  Needs refill on fluconzaole.  Steroids seem to help symptoms    REVIEW OF SYSTEM      Review of Systems   Respiratory:  Negative for chest tightness and shortness of breath.    Cardiovascular:  Negative for chest pain.         REVIEWED INFORMATION      Allergies   Allergen Reactions    Gardasil 9 [Human Papillomavirus 9-Valent Recombinant Vaccine] Hives       Current Outpatient Medications   Medication Sig Dispense Refill    montelukast (SINGULAIR) 10 MG tablet Take 1 tablet by mouth daily 30 tablet 3    nirmatrelvir/ritonavir 300/100 (PAXLOVID, 300/100,) 20 x 150 MG & 10 x 100MG TBPK Take 3 tablets (two 150 mg nirmatrelvir and one 100 mg ritonavir tablets) by mouth every 12 hours for 5 days. 30 tablet 0    fluconazole (DIFLUCAN) 150 MG tablet Take 1 tablet by mouth every 48 hours for 16 days 4 tablet 1    predniSONE (DELTASONE) 20 MG tablet TAKE 3 TABLETS BY MOUTH ONCE DAILY FOR 3 DAYS THEN 2 ONCE DAILY FOR 3 DAYS THEN 1 ONCE DAILY FOR 3 DAYS THEN OFF  AND  TAKE

## 2025-04-15 NOTE — PATIENT INSTRUCTIONS
Kaitlyn    Thank you for choosing [facility].  We know you have options when it comes to your healthcare; we appreciate that you chose us. Our goal is to provide exceptional  service and world class care to every patient.  You will be receiving a survey via email or text message asking for your feedback.  Please take a few minutes to share your thoughts about your recent visit. Your comments help us understand what we do well and ways we can improve.  Thank you in advance for your valuable feedback.      Dr. Cliff James, CMA